# Patient Record
Sex: MALE | Race: WHITE | NOT HISPANIC OR LATINO | Employment: OTHER | URBAN - METROPOLITAN AREA
[De-identification: names, ages, dates, MRNs, and addresses within clinical notes are randomized per-mention and may not be internally consistent; named-entity substitution may affect disease eponyms.]

---

## 2020-08-18 PROBLEM — Z86.19 HISTORY OF LYME DISEASE: Status: ACTIVE | Noted: 2020-08-18

## 2020-08-18 PROBLEM — E66.01 MORBID OBESITY WITH BMI OF 40.0-44.9, ADULT (HCC): Status: ACTIVE | Noted: 2020-08-18

## 2020-08-18 PROBLEM — K57.90 DIVERTICULOSIS: Status: ACTIVE | Noted: 2020-08-18

## 2020-08-18 PROBLEM — C18.2 CANCER OF ASCENDING COLON (HCC): Status: ACTIVE | Noted: 2017-04-20

## 2020-08-18 PROBLEM — F10.11 ALCOHOL ABUSE, IN REMISSION: Status: ACTIVE | Noted: 2020-08-18

## 2020-08-18 PROBLEM — Z85.038 HISTORY OF COLON CANCER: Status: ACTIVE | Noted: 2020-08-18

## 2020-08-18 PROBLEM — M10.9 GOUT: Status: ACTIVE | Noted: 2020-08-18

## 2020-11-14 PROBLEM — C18.2 CANCER OF ASCENDING COLON (HCC): Status: RESOLVED | Noted: 2017-04-20 | Resolved: 2020-11-14

## 2021-01-13 DIAGNOSIS — Z23 NEED FOR VACCINATION: ICD-10-CM

## 2021-09-01 PROBLEM — Z80.0 FAMILY HISTORY OF COLON CANCER: Status: ACTIVE | Noted: 2021-09-01

## 2021-10-08 PROBLEM — R01.1 SYSTOLIC MURMUR: Status: ACTIVE | Noted: 2021-10-08

## 2021-10-08 PROBLEM — R73.03 PRE-DIABETES: Status: ACTIVE | Noted: 2021-10-08

## 2021-12-02 PROBLEM — I35.0 AORTIC STENOSIS: Status: ACTIVE | Noted: 2021-12-02

## 2021-12-02 PROBLEM — I50.33 CHF NYHA CLASS III, ACUTE ON CHRONIC, DIASTOLIC (HCC): Status: ACTIVE | Noted: 2021-12-02

## 2022-01-11 PROBLEM — I34.0 SEVERE MITRAL REGURGITATION: Status: ACTIVE | Noted: 2022-01-11

## 2022-01-14 ENCOUNTER — TELEPHONE (OUTPATIENT)
Dept: CARDIOLOGY | Facility: MEDICAL CENTER | Age: 82
End: 2022-01-14

## 2022-01-14 DIAGNOSIS — I35.0 SEVERE AORTIC STENOSIS: ICD-10-CM

## 2022-01-14 DIAGNOSIS — Z01.810 PRE-OPERATIVE CARDIOVASCULAR EXAMINATION: ICD-10-CM

## 2022-01-14 DIAGNOSIS — R06.02 SHORTNESS OF BREATH: ICD-10-CM

## 2022-01-14 NOTE — TELEPHONE ENCOUNTER
Spoke with patient regarding [facility] referral placed by Dr. Root. Explained CTS consult, IC consult, CTA, and carotid duplex associated with such. Patient states understanding and agrees to all.    Reviewed details of all such appointments including date, time, and locations. Patient states he has written all such information and reads back all information accurately. Patient states no questions at this time and very thankful for call.     [facility] slot B 2/2 with [place] as patient is commuting from [place] and requested to consolidate to one trip to [place].

## 2022-01-19 ENCOUNTER — TELEPHONE (OUTPATIENT)
Dept: CARDIOLOGY | Facility: MEDICAL CENTER | Age: 82
End: 2022-01-19

## 2022-01-19 NOTE — TELEPHONE ENCOUNTER
Spoke with patient to inform per CTS patient to cancel testing and IC consult at this time.     Patient states understanding and agrees with plan to cancel testing and IC consult.     Patient asks how soon he will be scheduled for surgery after consult with CTS 2/2/22. Explained that CTS staff will discuss treatment and potential dates in detail at consult.

## 2022-02-24 ENCOUNTER — HOSPITAL ENCOUNTER (OUTPATIENT)
Dept: RADIOLOGY | Facility: MEDICAL CENTER | Age: 82
End: 2022-02-24

## 2022-05-13 PROBLEM — E78.2 HYPERLIPIDEMIA, MIXED: Status: ACTIVE | Noted: 2022-05-13

## 2022-05-13 PROBLEM — N52.9 VASCULOGENIC ERECTILE DYSFUNCTION: Status: ACTIVE | Noted: 2022-05-13

## 2022-09-29 PROBLEM — M79.642 LEFT HAND PAIN: Status: ACTIVE | Noted: 2022-09-29

## 2022-10-11 PROBLEM — I95.1 ORTHOSTATIC HYPOTENSION: Status: ACTIVE | Noted: 2022-10-11

## 2022-10-11 PROBLEM — G44.219 EPISODIC TENSION-TYPE HEADACHE, NOT INTRACTABLE: Status: ACTIVE | Noted: 2022-10-11

## 2023-03-28 PROBLEM — G89.4 PAIN SYNDROME, CHRONIC: Status: ACTIVE | Noted: 2023-03-28

## 2023-11-14 PROBLEM — M79.642 LEFT HAND PAIN: Status: RESOLVED | Noted: 2022-09-29 | Resolved: 2023-11-14

## 2023-11-21 PROBLEM — D72.829 LEUKOCYTOSIS: Status: ACTIVE | Noted: 2023-11-21

## 2023-11-21 PROBLEM — I07.1 TRICUSPID REGURGITATION: Status: ACTIVE | Noted: 2023-11-21

## 2023-11-21 PROBLEM — N17.9 AKI (ACUTE KIDNEY INJURY) (HCC): Status: ACTIVE | Noted: 2023-11-21

## 2023-11-21 PROBLEM — K56.609 SBO (SMALL BOWEL OBSTRUCTION) (HCC): Status: ACTIVE | Noted: 2023-11-21
